# Patient Record
Sex: FEMALE | Race: WHITE | NOT HISPANIC OR LATINO | Employment: OTHER | URBAN - METROPOLITAN AREA
[De-identification: names, ages, dates, MRNs, and addresses within clinical notes are randomized per-mention and may not be internally consistent; named-entity substitution may affect disease eponyms.]

---

## 2017-04-05 PROBLEM — M54.9 BACK PAIN: Status: ACTIVE | Noted: 2017-04-05

## 2017-04-05 PROBLEM — M81.0 OSTEOPOROSIS: Status: ACTIVE | Noted: 2017-04-05

## 2017-04-05 PROBLEM — M41.9 KYPHOSCOLIOSIS: Status: ACTIVE | Noted: 2017-04-05

## 2018-05-08 PROBLEM — N18.30 CKD (CHRONIC KIDNEY DISEASE), STAGE III: Status: ACTIVE | Noted: 2018-05-08

## 2019-01-22 PROBLEM — R06.02 SHORTNESS OF BREATH: Status: ACTIVE | Noted: 2019-01-22

## 2019-03-06 ENCOUNTER — PATIENT OUTREACH (OUTPATIENT)
Dept: HEALTH INFORMATION MANAGEMENT | Facility: OTHER | Age: 74
End: 2019-03-06

## 2019-03-06 NOTE — PROGRESS NOTES
Outcome: pt states she has not had a mammogram in the last 2 years and does not intend to have one done. Advised to discuss with pcp if she changes her mind. Preferred communication obtained       [facility] Verified: yes    Attempt # final

## 2019-05-15 PROBLEM — H40.89 OTHER SPECIFIED GLAUCOMA: Status: ACTIVE | Noted: 2019-05-15

## 2019-05-15 PROBLEM — M81.0 AGE-RELATED OSTEOPOROSIS WITHOUT CURRENT PATHOLOGICAL FRACTURE: Status: ACTIVE | Noted: 2019-05-15

## 2019-05-15 PROBLEM — M54.9 BACK PAIN: Status: RESOLVED | Noted: 2017-04-05 | Resolved: 2019-05-15

## 2019-05-15 PROBLEM — I10 ESSENTIAL HYPERTENSION: Status: ACTIVE | Noted: 2019-05-15

## 2019-05-15 PROBLEM — J43.9 PULMONARY EMPHYSEMA (HCC): Status: ACTIVE | Noted: 2019-05-15

## 2019-05-15 PROBLEM — M81.0 OSTEOPOROSIS: Status: RESOLVED | Noted: 2017-04-05 | Resolved: 2019-05-15

## 2019-05-15 PROBLEM — M41.40 NEUROMUSCULAR SCOLIOSIS: Status: ACTIVE | Noted: 2019-05-15

## 2019-05-15 PROBLEM — I27.81 COR PULMONALE (HCC): Status: ACTIVE | Noted: 2019-05-15

## 2019-05-15 PROBLEM — N18.30 CKD (CHRONIC KIDNEY DISEASE), STAGE III: Status: RESOLVED | Noted: 2018-05-08 | Resolved: 2019-05-15

## 2019-09-27 PROBLEM — M85.89 OSTEOPENIA OF MULTIPLE SITES: Status: ACTIVE | Noted: 2019-05-15

## 2019-09-27 PROBLEM — M54.9 CHRONIC BACK PAIN: Status: ACTIVE | Noted: 2019-09-27

## 2019-09-27 PROBLEM — R60.9 PERIPHERAL EDEMA: Status: ACTIVE | Noted: 2019-09-27

## 2019-09-27 PROBLEM — G89.29 CHRONIC BACK PAIN: Status: ACTIVE | Noted: 2019-09-27

## 2019-09-27 PROBLEM — J34.89 RHINORRHEA: Status: ACTIVE | Noted: 2019-09-27

## 2020-06-29 PROBLEM — B07.8 OTHER VIRAL WARTS: Status: ACTIVE | Noted: 2020-06-29

## 2020-11-05 PROBLEM — D75.1 POLYCYTHEMIA: Status: ACTIVE | Noted: 2020-11-05

## 2021-07-01 PROBLEM — I10 ESSENTIAL HYPERTENSION: Status: RESOLVED | Noted: 2019-05-15 | Resolved: 2021-07-01

## 2021-07-01 PROBLEM — K21.9 GERD (GASTROESOPHAGEAL REFLUX DISEASE): Status: ACTIVE | Noted: 2021-07-01

## 2022-10-03 PROBLEM — F51.01 PRIMARY INSOMNIA: Status: ACTIVE | Noted: 2022-10-03

## 2022-10-03 PROBLEM — F41.9 ANXIETY: Status: ACTIVE | Noted: 2022-10-03

## 2023-10-30 PROBLEM — D75.1 POLYCYTHEMIA: Status: RESOLVED | Noted: 2020-11-05 | Resolved: 2023-10-30

## 2023-10-30 PROBLEM — I27.81 COR PULMONALE (HCC): Status: RESOLVED | Noted: 2019-05-15 | Resolved: 2023-10-30

## 2023-10-30 PROBLEM — J34.89 RHINORRHEA: Status: RESOLVED | Noted: 2019-09-27 | Resolved: 2023-10-30

## 2023-10-30 PROBLEM — R60.9 PERIPHERAL EDEMA: Status: RESOLVED | Noted: 2019-09-27 | Resolved: 2023-10-30
